# Patient Record
Sex: FEMALE | Race: WHITE
[De-identification: names, ages, dates, MRNs, and addresses within clinical notes are randomized per-mention and may not be internally consistent; named-entity substitution may affect disease eponyms.]

---

## 2018-09-13 ENCOUNTER — HOSPITAL ENCOUNTER (OUTPATIENT)
Age: 78
End: 2018-09-13
Payer: MEDICARE

## 2018-09-13 DIAGNOSIS — E03.9: ICD-10-CM

## 2018-09-13 DIAGNOSIS — C50.919: ICD-10-CM

## 2018-09-13 DIAGNOSIS — I10: ICD-10-CM

## 2018-09-13 DIAGNOSIS — E11.9: ICD-10-CM

## 2018-09-13 DIAGNOSIS — E78.00: Primary | ICD-10-CM

## 2018-09-13 LAB
ALBUMIN SERPL-MCNC: 4.2 G/DL (ref 3.5–5)
ALBUMIN/GLOB SERPL: 1.1 {RATIO} (ref 1–2.8)
ALP SERPL-CCNC: 170 U/L (ref 38–126)
ALT SERPL-CCNC: 31 IU/L (ref 9–52)
ATYPICAL LYMPHOCYTES PERCENT: 6 %
BUN SERPL-MCNC: 26 MG/DL (ref 7–17)
CALCIUM SERPL-MCNC: 10 MG/DL (ref 8.4–10.2)
CHLORIDE SERPL-SCNC: 106 MMOL/L (ref 98–107)
CHOLEST SERPL-MCNC: 159 MG/DL (ref 140–199)
CO2 SERPL-SCNC: 27 MMOL/L (ref 22–32)
EOSINOPHILS PERCENT MANUAL: 5 % (ref 2–4)
ESTIMATED GLOMERULAR FILT RATE: > 60 ML/MIN (ref 60–?)
GLOBULIN SER CALC-MCNC: 3.8 G/DL (ref 1.7–4.1)
GLUCOSE SERPL-MCNC: 124 MG/DL (ref 80–110)
HBA1C MFR BLD: 8.3 % (ref 4–6)
HDLC SERPL-MCNC: 31 MG/DL (ref 40–60)
HEMATOCRIT: 39 % (ref 36–46)
HEMOGLOBIN: 13.5 G/DL (ref 12–16)
HEMOLYSIS: < 15 (ref 0–50)
LYMPHOCYTES PERCENT MANUAL: 19 % (ref 25–45)
MCV RBC: 89.9 FL (ref 80–100)
MEAN CORPUSCULAR HEMOGLOBIN: 31.2 PG (ref 26–34)
MEAN CORPUSCULAR HGB CONC: 34.7 % (ref 30–36)
MONOCYTES PERCENT MANUAL: 6 % (ref 2–11)
NEUTROPHILS ABSOLUTE MANUAL: 4928 /UL (ref 3000–5900)
NEUTS SEG NFR BLD: 64 % (ref 38–70)
PLATELET COUNT: 207 X10^3/UL (ref 150–400)
POTASSIUM SERPL-SCNC: 5 MMOL/L (ref 3.4–5.1)
PROT SERPL-MCNC: 8 G/DL (ref 6.3–8.2)
SODIUM SERPL-SCNC: 145 MMOL/L (ref 137–145)
TOTAL CELLS COUNTED: 100
TRIGL SERPL-MCNC: 165 MG/DL (ref 35–150)
TSH SERPL DL<=0.05 MIU/L-ACNC: 6.43 UIU/ML (ref 0.47–4.68)

## 2018-09-13 PROCEDURE — 80061 LIPID PANEL: CPT

## 2018-09-13 PROCEDURE — 84443 ASSAY THYROID STIM HORMONE: CPT

## 2018-09-13 PROCEDURE — 36415 COLL VENOUS BLD VENIPUNCTURE: CPT

## 2018-09-13 PROCEDURE — 83036 HEMOGLOBIN GLYCOSYLATED A1C: CPT

## 2018-09-13 PROCEDURE — 80053 COMPREHEN METABOLIC PANEL: CPT

## 2018-09-13 PROCEDURE — 85025 COMPLETE CBC W/AUTO DIFF WBC: CPT

## 2018-09-17 ENCOUNTER — HOSPITAL ENCOUNTER (OUTPATIENT)
Age: 78
End: 2018-09-17
Payer: MEDICARE

## 2018-09-17 DIAGNOSIS — Z79.899: Primary | ICD-10-CM

## 2018-09-17 PROCEDURE — 77080 DXA BONE DENSITY AXIAL: CPT

## 2018-09-17 PROCEDURE — 93306 TTE W/DOPPLER COMPLETE: CPT

## 2018-09-17 NOTE — DI.ECHO.S_ITS
"                                    [facility]  
+---------+                        [facility]                        +---------+  
:         :                      [address]                     :         :  
:         :                      [address]                     :         :  
:         :                          [id number]                         :         :  
:         :                       Phone: [phone]                      :         :  
+---------+                        [phone]                        +---------+  
                             Echocardiogram Report  
+-----------------------------------------------------------------------------+  
:Name: EVANGELIST OSBORNE      Study Date: 2018          Height: 66 in  :  
:[facility] MRN #: [id number]                                    Weight: 212 lb :  
:Account #: [id number]         Gender: Female                  BSA: 2.1 m2    :  
:: 1940               Age: 78 yrs                     BP: 112/82 mmHg:  
:Reason For Study: BREAST CANCER                                              :  
:                              Performed By: Carolee Cagle                   :  
:Referring: SUMAYA LINDSEY                                                   :  
+-----------------------------------------------------------------------------+  
Interpretation Summary  
The ejection fraction is estimated to be 60-65%.  
There are no focal wall motion abnormalities.  
There is mild to moderate mitral regurgitation.  
Compared to the prior echo study, there has been no change in the severity of  
mitral regurgitation.  
There is mild aortic regurgitation.  
This is unchanged compared to the previous study.  
There is mild tricuspid regurgitation.  
Right ventricular systolic pressure is estimated to be 40 mmHg plus the  
clinically estimated CVP which cannot be estimated on this exam.  
Compared to the prior echo exam, there has been an increase in the severity of  
pulmonary hypertension.  
   
   
Procedure:   A two-dimensional transthoracic echocardiogram with color flow  
and Doppler was performed. The study quality was technically adequate.  
Comparison is made with the echocardiogram of 2017. The patient was in  
sinus bradycardia with heart rates between 48-57 bpm during the exam.  
Left Ventricle:   There is borderline concentric left ventricular hypertrophy.  
The left ventricle is normal in size. The ejection fraction is estimated to be  
60-65%. There are no focal wall motion abnormalities.  
Right Ventricle:   The right ventricle is normal in size and function.  
Atria:   Both atria are normal in size. There is no Doppler evidence for an  
interatrial shunt.  
Mitral Valve:   There is mild mitral annular calcification. There is a flat  
closure plane of the the mitral valve leaflets. There is mild to moderate  
mitral regurgitation. Compared to the prior echo study, there has been no  
change in the severity of mitral regurgitation.  
Aortic Valve:   The aortic valve is trileaflet. The aortic valve opens well.  
There is mild aortic regurgitation. This is unchanged compared to the previous  
study.  
Tricuspid Valve:   The tricuspid valve leaflets are thin and pliable. There is  
mild tricuspid regurgitation. Right ventricular systolic pressure is estimated  
to be 40 mmHg plus the clinically estimated CVP which cannot be estimated on  
this exam. Compared to the prior echo exam, there has been an increase in the  
severity of pulmonary hypertension.  
Pulmonic Valve:   The pulmonic valve is normal in structure and function.  
There is a trace or physiologic amount of pulmonic regurgitation.  
Great Vessels:   The aortic root is normal size. The ascending aorta is normal  
in size. The aortic arch is normal in size. The inferior vena cava was not  
visualized.  
Pericardium/ Pleura   There is no pericardial effusion.  
   
   
MMode/2D Measurements & Calculations  
LVIDd: 5.0 cm                
[id number]|2018 00:00:00|2018 11:29:00|.S_ITS|[facility]|Imaging|[id number]|"BILATERAL DIGITAL SCREENING MAMMOGRAM 3D/2D WITH CAD: 2018

## 2018-09-17 NOTE — DI.RAD.S_ITS
This blank DEXA report has been sent in error by the PACS system.  The correct and   
complete report will be forthcoming in 1-2 days.  Thank you for your patience and   
understanding.     
   
   
   
Dictated by: Mel Nicholas MD, PhD on 9/17/2018 at 11:36       
Approved by: Mel Nicholas MD, PhD on 9/17/2018 at 11:36

## 2018-09-26 ENCOUNTER — HOSPITAL ENCOUNTER
Age: 78
LOS: 1 days | End: 2018-09-27
Payer: MEDICARE

## 2018-09-26 DIAGNOSIS — Z85.819: ICD-10-CM

## 2018-09-26 DIAGNOSIS — C50.919: Primary | ICD-10-CM

## 2018-09-26 DIAGNOSIS — Z79.811: ICD-10-CM

## 2018-09-26 DIAGNOSIS — Z85.528: ICD-10-CM

## 2018-09-26 PROCEDURE — 99214 OFFICE O/P EST MOD 30 MIN: CPT

## 2018-09-26 NOTE — P.PNONC_ITS
PN -Subjective    
Interval history:     
Diagnosis:    
1.  Breast cancer, T3 N0 ER positive.  Previously treated with neoadjuvant   
Adriamycin and Cytoxan followed by docetaxel.  She had a bilateral mastectomy   
with pathologic complete response.  She also had postoperative chest wall   
radiation and has been on hormone therapy with anastrozole since 2003.    
2.  Renal cell carcinoma treated with nephrectomy in 2003.    
3. 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   
ago.    
    
Interval history:  The patient is a 78-year-old woman who returns today for   
follow-up.  She is feeling well and has no specific complaints.  She denies any   
new aches or pains.  She has not noted any adenopathy. No fevers chills or   
sweats.  No shortness of breath or cough.  No GI complaints.  She has not noted   
any changes on the chest wall or axilla.  She continues to take anastrozole.    
She has been on it for about 15 years now.  She is not having any hot flashes.    
She denies any musculoskeletal aches and pains.  She would like to continue   
indefinitely.    
    
Her past medical history is otherwise notable for diabetes hypertension   
hyperlipidemia and hypothyroidism.  She also has a history of primary   
sclerosing cholangitis.  She has had some depression of the last couple years   
following the death of her .    
    
Her medications include ursodiol, atenolol gabapentin anastrozole Lipitor   
insulin and levothyroxine    
    
    
    
Home Medications and Allergies    
 Home Medications    
    
    
    
 Medication  Instructions  Recorded  Confirmed  Type    
     
anastrozole 1 mg PO QDAY #0 06/15/17  History    
     
atorvastatin [Lipitor] 20 mg PO HS #0 06/15/17  History    
     
insulin aspart U-100 [Novolog #0 06/15/17  History    
    
Flexpen U-100 Insulin]        
     
insulin glargine [Lantus U-100 60 u SQ QDAY #0 06/15/17  History    
    
Insulin]        
     
levothyroxine [Levoxyl] 112 mcg PO #0 06/15/17  History    
    
    
    
 Allergies    
    
    
    
Allergy/AdvReac Type Severity Reaction Status Date / Time    
     
venom-honey bee Allergy Unknown  Unverified 04/11/18 13:10    
    
[BEE VENOM (HONEY BEE)]         
    
    
    
    
Exam    
    
- Constitutional    
positive no acute distress, positive obese    
    
- Routine HEENT Exam    
Head: Present: normocephalic, atraumatic    
Eye: Present: EOMI, PERRL.  Absent: conjunctival icterus, scleral injection    
ENT: Present: mucous membranes moist, oropharynx clear    
    
- Routine Neck Exam    
Present: supple.  Absent: lymphadenopathy, thyromegaly    
    
- Routine Chest/Breast/Axilla Exam    
Chest wall exam standard: Absent: tenderness, mass    
Breast: Present: right mastectomy, left mastectomy    
Axillae: Absent: lymphadenopathy    
    
- Routine Respiratory Exam    
Present: Clear to auscultation bilaterally.  Absent: rales, wheezes    
    
- Routine Cardiovascular Exam    
Present: RRR, S1, S2.  Absent: murmur    
    
- Routine Abdominal Exam    
Present: soft, normoactive bowel sounds.  Absent: tenderness, organomegaly, mass    
    
- Routine Extremities Exam    
Absent: cyanosis, clubbing, edema    
    
- Routine Back/Spine Exam    
Back/Spine: Absent: paraspinal tenderness, vertebral tenderness    
    
- Routine Skin Exam    
Present: intact.  Absent: petechiae, rash    
    
- Routine Neurological Exam    
Present: alert, oriented X3    
    
- Routine Psychiatric Exam    
Present: normal affect, normal thought process    
    
Results    
    
- Labs    
    
A CBC was within normal limits.  Metabolic panel showed a normal creatinine at   
0.9.  Alkaline phosphatase was 170 A AST was slightly elevated at 39.  TSH was   
slightly high at 6.4 in her hemoglobin A1c was 8.3.    
A DEXA scan showed normal bone density although decreased compared
[id number]|2018-09-26 13:31:47|2018-09-26 13:31:47|ONC.PN||||

## 2019-12-06 ENCOUNTER — HOSPITAL ENCOUNTER (EMERGENCY)
Age: 79
Discharge: HOME | End: 2019-12-06
Payer: MEDICARE

## 2019-12-06 VITALS
DIASTOLIC BLOOD PRESSURE: 75 MMHG | OXYGEN SATURATION: 97 % | TEMPERATURE: 97.7 F | SYSTOLIC BLOOD PRESSURE: 143 MMHG | RESPIRATION RATE: 18 BRPM | HEART RATE: 64 BPM

## 2019-12-06 VITALS
RESPIRATION RATE: 16 BRPM | DIASTOLIC BLOOD PRESSURE: 70 MMHG | SYSTOLIC BLOOD PRESSURE: 130 MMHG | HEART RATE: 60 BPM | OXYGEN SATURATION: 98 %

## 2019-12-06 VITALS — BODY MASS INDEX: 35.2 KG/M2

## 2019-12-06 DIAGNOSIS — S59.912A: ICD-10-CM
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
## 2019-12-06 DIAGNOSIS — S63.502A: Primary | ICD-10-CM

## 2019-12-06 DIAGNOSIS — S66.912A: ICD-10-CM

## 2019-12-06 PROCEDURE — 29260 STRAPPING OF ELBOW OR WRIST: CPT

## 2019-12-06 PROCEDURE — 99282 EMERGENCY DEPT VISIT SF MDM: CPT

## 2019-12-06 PROCEDURE — 73090 X-RAY EXAM OF FOREARM: CPT

## 2019-12-06 PROCEDURE — 73110 X-RAY EXAM OF WRIST: CPT

## 2019-12-06 PROCEDURE — 99283 EMERGENCY DEPT VISIT LOW MDM: CPT

## 2019-12-06 NOTE — DI.RAD.S_ITS
PROCEDURE:  XR FOREARM RT 2V  
   
INDICATIONS:  fall/proximal forearm pain  
   
TECHNIQUE:  2 views of the forearm were acquired.    
   
COMPARISON:  [facility], CR, XR WRIST LT MIN 3V, 12/06/2019, 7:55.  
   
FINDINGS:    
   
Bones:  No fractures or dislocations.  No suspicious bony lesions.  Degenerative   
osteoarthritis at the radiocarpal joint is moderate to moderately severe but no trauma is   
found  
   
Soft tissues:  No suspicious soft tissue calcifications or masses.    
   
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   
but no trauma found.  
   
   
   
   
   
Dictated by: Aravind Vasquez M.D. on 12/06/2019 at 8:20       
Approved by: Aravind Vasquez M.D. on 12/06/2019 at 8:21

## 2019-12-06 NOTE — ED_ITS
"HPI - Extremity Injury (Upper)    
General    
Chief Complaint: Extremity Injury, Upper    
Stated Complaint: fell last night,hurt left wrist    
Time Seen by Provider: 12/06/19 07:42    
Source: patient    
Mode of arrival: Ambulatory    
Limitations: no limitations    
History of Present Illness    
HPI narrative: Patient comes emergency department complaining of left wrist and   
forearm pain after tripping and falling and landing on her outstretched arm on   
the pavement yesterday evening.  Patient states that she sustained bruises to   
her right hip and knee, but otherwise, was not injured.  She has been able to   
ambulate on the right lower extremity without difficulty.  Patient denies prior   
history of injury to the left wrist.  She states it hurts to move, but should   
come in right away because she was hoping it is just bruised.  She denies neck   
pain.  She states that when she fell, her head hit the ground, mainly making   
contact with her glasses.  She states that she has not had any pain and did not   
hit her head very hard.  She has not noticed any bruising or swelling.  no   
visual changes or neck pain.  patient denies rib pain.  No back pain.  no   
abdominal pain.  No other complaints at this time.    
Related Data    
                                Home Medications    
    
    
    
 Medication  Instructions  Recorded  Confirmed    
     
anastrozole 1 mg PO QDAY #0 06/15/17 09/26/18    
     
atorvastatin [Lipitor] 20 mg PO HS #0 06/15/17 09/26/18    
     
insulin aspart U-100 [Novolog #0 06/15/17     
    
Flexpen U-100 Insulin]       
     
insulin glargine [Lantus U-100 60 u SQ QDAY #0 06/15/17     
    
Insulin]       
     
levothyroxine [Levoxyl] 112 mcg PO DAILY #0 06/15/17 09/26/18    
    
    
    
                                    Allergies    
    
    
    
Allergy/AdvReac Type Severity Reaction Status Date / Time    
     
venom-honey bee Allergy Unknown  Verified 09/26/18 13:51    
    
[BEE VENOM (HONEY BEE)]         
    
    
    
    
Review of Systems    
Constitutional    
Constitutional: Denies chills, Denies fatigue, Denies fever(s), Denies frequent   
falls, Denies lethargy and Denies weakness    
Eyes    
Eyes: Denies change in vision, Denies eye discharge, Denies irritation and Eddi  
es loss of vision    
ENT    
Ears, Nose, Mouth, and Throat: Denies change in voice, Denies dizziness, Denies   
neck pain, Denies sore throat and Denies throat swelling    
Cardiovascular    
Cardiovascular: Denies chest pain, Denies irregular heart rhythm, Denies   
lightheadedness, Denies palpitations, Denies dyspnea, Denies dyspnea on exertion  
and Denies orthopnea    
Respiratory    
Respiratory: Denies cough, Denies dyspnea, Denies dyspnea on exertion and Denies  
wheezing    
Gastrointestinal    
Gastrointestinal: Denies abdominal pain, Denies change in bowel habits, Denies   
diarrhea, Denies nausea and Denies vomiting    
Genitourinary    
Genitourinary: Denies hematuria, Denies flank pain, Denies urinary incontinence   
and Denies urinary urgency    
Musculoskeletal    
Musculoskeletal: Denies back pain, Denies muscle weakness, Denies neck pain,   
Denies numbness and Denies tingling    
Comments: Wrist pain    
Integumentary/Breasts    
Skin/Breast: Denies pruritus, Denies erythema, Denies rash and Denies wounds    
Neurologic    
Neurologic: Denies behavioral changes, Denies confusion, Denies dizziness, D  
enies frequent falls, Denies loss of vision, Denies numbness, Denies tingling   
and Denies weakness    
Psychiatric    
Psychiatric: Denies anxiety, Denies behavioral changes, Denies confusion, Denies  
depression, Denies homicidal ideation and Denies suicidal ideation    
Endocrine    
Endocrine: Denies fatigue, Denies flushing and Denies palpitations    
Hematologic/Lymphatic    
Hematologic/Lymphatic: Denies easy bruising    
Allergic/Immunologic    
Allergic/Immunologic
[id number]|2019-12-06 07:31:00|2019-12-06 08:23:00|DI.RAD.S_ITS|[facility]|Imaging|[id number]|"PROCEDURE:  XR WRIST LT MIN 3V

## 2020-05-28 ENCOUNTER — HOSPITAL ENCOUNTER (OUTPATIENT)
Age: 80
End: 2020-05-28
Payer: MEDICARE

## 2020-05-28 DIAGNOSIS — E11.9: Primary | ICD-10-CM

## 2020-05-28 LAB — HBA1C MFR BLD: 7.4 % (ref 4–6)

## 2020-05-28 PROCEDURE — 36415 COLL VENOUS BLD VENIPUNCTURE: CPT

## 2020-05-28 PROCEDURE — 83036 HEMOGLOBIN GLYCOSYLATED A1C: CPT

## 2021-01-27 ENCOUNTER — HOSPITAL ENCOUNTER (OUTPATIENT)
Age: 81
End: 2021-01-27
Payer: MEDICARE

## 2021-01-27 DIAGNOSIS — Z23: Primary | ICD-10-CM

## 2021-01-27 PROCEDURE — 0011A: CPT

## 2021-01-27 PROCEDURE — 91301: CPT

## 2021-02-24 ENCOUNTER — HOSPITAL ENCOUNTER (OUTPATIENT)
Age: 81
End: 2021-02-24
Payer: MEDICARE

## 2021-02-24 DIAGNOSIS — Z23: Primary | ICD-10-CM

## 2021-02-24 PROCEDURE — 0012A: CPT

## 2021-02-24 PROCEDURE — 91301: CPT

## 2021-11-18 ENCOUNTER — HOSPITAL ENCOUNTER (OUTPATIENT)
Age: 81
End: 2021-11-18
Payer: MEDICARE

## 2021-11-18 DIAGNOSIS — Z20.822: Primary | ICD-10-CM

## 2021-11-18 PROCEDURE — 87635 SARS-COV-2 COVID-19 AMP PRB: CPT

## 2022-05-04 ENCOUNTER — HOSPITAL ENCOUNTER (OUTPATIENT)
Age: 82
End: 2022-05-04
Payer: MEDICARE

## 2022-05-04 DIAGNOSIS — E03.9: Primary | ICD-10-CM

## 2022-05-04 DIAGNOSIS — I10: ICD-10-CM

## 2022-05-04 DIAGNOSIS — E11.42: ICD-10-CM

## 2022-05-04 DIAGNOSIS — E78.2: ICD-10-CM

## 2022-05-04 LAB
ALBUMIN SERPL-MCNC: 4.3 G/DL (ref 3.5–5)
ALBUMIN/GLOB SERPL: 1.1 {RATIO} (ref 1–2.8)
ALP SERPL-CCNC: 175 U/L (ref 38–126)
ALT SERPL-CCNC: 25 IU/L (ref ?–35)
BUN SERPL-MCNC: 32 MG/DL (ref 7–17)
CALCIUM SERPL-MCNC: 8.9 MG/DL (ref 8.4–10.2)
CHLORIDE SERPL-SCNC: 103 MMOL/L (ref 98–107)
CHOLEST SERPL-MCNC: 175 MG/DL (ref 140–199)
CO2 SERPL-SCNC: 27 MMOL/L (ref 22–32)
ESTIMATED GLOMERULAR FILT RATE: 37 ML/MIN (ref 60–?)
GLOBULIN SER CALC-MCNC: 3.9 G/DL (ref 1.7–4.1)
GLUCOSE SERPL-MCNC: 224 MG/DL (ref 80–110)
HBA1C MFR BLD: 10.4 % (ref 4–6)
HDLC SERPL-MCNC: 31 MG/DL (ref 40–60)
HEMATOCRIT: 38.2 % (ref 36–46)
HEMOGLOBIN: 12.8 G/DL (ref 12–16)
HEMOLYSIS: < 15 (ref 0–50)
MCV RBC: 90.9 FL (ref 80–100)
MEAN CORPUSCULAR HEMOGLOBIN: 30.5 PG (ref 26–34)
MEAN CORPUSCULAR HGB CONC: 33.6 % (ref 30–36)
PLATELET COUNT: 190 X10^3/UL (ref 150–400)
POTASSIUM SERPL-SCNC: 4.5 MMOL/L (ref 3.4–5.1)
PROT SERPL-MCNC: 8.2 G/DL (ref 6.3–8.2)
SODIUM SERPL-SCNC: 138 MMOL/L (ref 137–145)
T4 FREE SERPL-MCNC: 1.27 NG/DL (ref 0.78–2.19)
TRIGL SERPL-MCNC: 239 MG/DL (ref 35–150)
TSH W/ REFLEX TO FT4: 9.29 UIU/ML (ref 0.47–4.68)

## 2022-05-04 PROCEDURE — 80053 COMPREHEN METABOLIC PANEL: CPT

## 2022-05-04 PROCEDURE — 83036 HEMOGLOBIN GLYCOSYLATED A1C: CPT

## 2022-05-04 PROCEDURE — 80061 LIPID PANEL: CPT

## 2022-05-04 PROCEDURE — 84443 ASSAY THYROID STIM HORMONE: CPT

## 2022-05-04 PROCEDURE — 36415 COLL VENOUS BLD VENIPUNCTURE: CPT

## 2022-05-04 PROCEDURE — 84439 ASSAY OF FREE THYROXINE: CPT

## 2022-05-04 PROCEDURE — 85027 COMPLETE CBC AUTOMATED: CPT

## 2022-05-05 ENCOUNTER — HOSPITAL ENCOUNTER (OUTPATIENT)
Age: 82
End: 2022-05-05
Payer: MEDICARE

## 2022-05-05 DIAGNOSIS — E03.9: Primary | ICD-10-CM

## 2022-05-05 DIAGNOSIS — E78.2: ICD-10-CM

## 2022-05-05 DIAGNOSIS — E11.42: ICD-10-CM

## 2022-05-05 DIAGNOSIS — I10: ICD-10-CM

## 2022-05-05 LAB — MICROALBUMI CREATININ RATIO UR: 50.5 UG/MG CR (ref ?–30)

## 2022-05-05 PROCEDURE — 82570 ASSAY OF URINE CREATININE: CPT

## 2022-05-05 PROCEDURE — 82043 UR ALBUMIN QUANTITATIVE: CPT

## 2022-11-22 ENCOUNTER — HOSPITAL ENCOUNTER (OUTPATIENT)
Age: 82
End: 2022-11-22
Payer: MEDICARE

## 2022-11-22 DIAGNOSIS — E11.9: Primary | ICD-10-CM

## 2022-11-22 DIAGNOSIS — E11.42: Primary | ICD-10-CM

## 2022-11-22 DIAGNOSIS — Z79.84: ICD-10-CM

## 2022-11-22 DIAGNOSIS — Z71.3: ICD-10-CM

## 2022-11-22 LAB — HBA1C MFR BLD: 9 % (ref 4–6)

## 2022-11-22 PROCEDURE — 97802 MEDICAL NUTRITION INDIV IN: CPT

## 2022-11-22 PROCEDURE — 36415 COLL VENOUS BLD VENIPUNCTURE: CPT

## 2022-11-22 PROCEDURE — 83036 HEMOGLOBIN GLYCOSYLATED A1C: CPT

## 2023-02-28 ENCOUNTER — HOSPITAL ENCOUNTER (OUTPATIENT)
Age: 83
End: 2023-02-28
Payer: MEDICARE

## 2023-02-28 DIAGNOSIS — E03.9: Primary | ICD-10-CM

## 2023-02-28 DIAGNOSIS — I10: ICD-10-CM

## 2023-02-28 DIAGNOSIS — E11.42: ICD-10-CM

## 2023-02-28 DIAGNOSIS — E78.2: ICD-10-CM

## 2023-02-28 DIAGNOSIS — E55.9: ICD-10-CM

## 2023-02-28 DIAGNOSIS — N18.32: ICD-10-CM

## 2023-02-28 LAB
25(OH)D3+25(OH)D2 SERPL-MCNC: < 12.8 NG/ML (ref 30–100)
ALBUMIN SERPL-MCNC: 3.7 G/DL (ref 3.5–5)
ALBUMIN/GLOB SERPL: 1 {RATIO} (ref 1–2.8)
ALP SERPL-CCNC: 176 U/L (ref 38–126)
ALT SERPL-CCNC: 16 IU/L (ref ?–35)
BUN SERPL-MCNC: 30 MG/DL (ref 7–17)
CALCIUM SERPL-MCNC: 9.1 MG/DL (ref 8.4–10.2)
CHLORIDE SERPL-SCNC: 100 MMOL/L (ref 98–107)
CHOLEST SERPL-MCNC: 165 MG/DL (ref 140–199)
CO2 SERPL-SCNC: 27 MMOL/L (ref 22–32)
ESTIMATED GLOMERULAR FILT RATE: 44 ML/MIN (ref 60–?)
GLOBULIN SER CALC-MCNC: 3.6 G/DL (ref 1.7–4.1)
GLUCOSE SERPL-MCNC: 172 MG/DL (ref 80–110)
HBA1C MFR BLD: 8 % (ref 4–6)
HDLC SERPL-MCNC: 37 MG/DL (ref 40–60)
HEMATOCRIT: 37 % (ref 36–46)
HEMOGLOBIN: 12.2 G/DL (ref 12–16)
HEMOLYSIS: < 15 (ref 0–50)
MCV RBC: 90.6 FL (ref 80–100)
MEAN CORPUSCULAR HEMOGLOBIN: 29.9 PG (ref 26–34)
MEAN CORPUSCULAR HGB CONC: 33 % (ref 30–36)
PLATELET COUNT: 194 X10^3/UL (ref 150–400)
POTASSIUM SERPL-SCNC: 4.8 MMOL/L (ref 3.4–5.1)
PROT SERPL-MCNC: 7.3 G/DL (ref 6.3–8.2)
SODIUM SERPL-SCNC: 137 MMOL/L (ref 137–145)
T4 FREE SERPL-MCNC: 0.88 NG/DL (ref 0.78–2.19)
TRIGL SERPL-MCNC: 331 MG/DL (ref 35–150)
TSH W/ REFLEX TO FT4: 8.1 UIU/ML (ref 0.47–4.68)

## 2023-02-28 PROCEDURE — 82306 VITAMIN D 25 HYDROXY: CPT

## 2023-02-28 PROCEDURE — 83036 HEMOGLOBIN GLYCOSYLATED A1C: CPT

## 2023-02-28 PROCEDURE — 84443 ASSAY THYROID STIM HORMONE: CPT

## 2023-02-28 PROCEDURE — 80061 LIPID PANEL: CPT

## 2023-02-28 PROCEDURE — 84439 ASSAY OF FREE THYROXINE: CPT

## 2023-02-28 PROCEDURE — 83970 ASSAY OF PARATHORMONE: CPT

## 2023-02-28 PROCEDURE — 85027 COMPLETE CBC AUTOMATED: CPT

## 2023-02-28 PROCEDURE — 82310 ASSAY OF CALCIUM: CPT

## 2023-02-28 PROCEDURE — 80053 COMPREHEN METABOLIC PANEL: CPT

## 2023-02-28 PROCEDURE — 36415 COLL VENOUS BLD VENIPUNCTURE: CPT

## 2023-03-02 LAB
CALCIUM: 9.4 MG/DL (ref 8.7–10.3)
PARATHYROID HORMONE, INTACT: 72 PG/ML (ref 15–65)

## 2023-07-21 ENCOUNTER — HOSPITAL ENCOUNTER (OUTPATIENT)
Age: 83
End: 2023-07-21
Payer: MEDICARE

## 2023-07-21 DIAGNOSIS — E03.9: ICD-10-CM

## 2023-07-21 DIAGNOSIS — E55.9: ICD-10-CM

## 2023-07-21 DIAGNOSIS — I10: Primary | ICD-10-CM

## 2023-07-21 LAB
25(OH)D3+25(OH)D2 SERPL-MCNC: 27.3 NG/ML (ref 30–100)
BUN SERPL-MCNC: 24 MG/DL (ref 7–17)
CALCIUM SERPL-MCNC: 9.6 MG/DL (ref 8.4–10.2)
CHLORIDE SERPL-SCNC: 102 MMOL/L (ref 98–107)
CO2 SERPL-SCNC: 30 MMOL/L (ref 22–32)
ESTIMATED GLOMERULAR FILT RATE: 42 ML/MIN (ref 60–?)
GLUCOSE SERPL-MCNC: 119 MG/DL (ref 80–110)
HEMOLYSIS: < 15 (ref 0–50)
POTASSIUM SERPL-SCNC: 4.7 MMOL/L (ref 3.4–5.1)
SODIUM SERPL-SCNC: 139 MMOL/L (ref 137–145)
T4 FREE SERPL-MCNC: 0.96 NG/DL (ref 0.78–2.19)
TSH W/ REFLEX TO FT4: 7.14 UIU/ML (ref 0.47–4.68)

## 2023-07-21 PROCEDURE — 84439 ASSAY OF FREE THYROXINE: CPT

## 2023-07-21 PROCEDURE — 84443 ASSAY THYROID STIM HORMONE: CPT

## 2023-07-21 PROCEDURE — 36415 COLL VENOUS BLD VENIPUNCTURE: CPT

## 2023-07-21 PROCEDURE — 80048 BASIC METABOLIC PNL TOTAL CA: CPT

## 2023-07-21 PROCEDURE — 82306 VITAMIN D 25 HYDROXY: CPT

## 2024-11-19 ENCOUNTER — HOSPITAL ENCOUNTER (OUTPATIENT)
Dept: HOSPITAL 73 - LAB | Age: 84
End: 2024-11-19
Payer: MEDICARE

## 2024-11-19 DIAGNOSIS — I12.9: ICD-10-CM

## 2024-11-19 DIAGNOSIS — E03.9: ICD-10-CM

## 2024-11-19 DIAGNOSIS — E11.42: Primary | ICD-10-CM

## 2024-11-19 DIAGNOSIS — N18.32: ICD-10-CM

## 2024-11-19 DIAGNOSIS — E78.2: ICD-10-CM

## 2024-11-19 LAB
BUN SERPL-MCNC: 31 MG/DL (ref 7–17)
CALCIUM SERPL-MCNC: 9.6 MG/DL (ref 8.4–10.2)
CHLORIDE SERPL-SCNC: 105 MMOL/L (ref 98–107)
CHOLEST SERPL-MCNC: 188 MG/DL (ref 140–199)
CO2 SERPL-SCNC: 29 MMOL/L (ref 22–32)
ESTIMATED GLOMERULAR FILT RATE: 39 ML/MIN (ref 60–?)
GLUCOSE SERPL-MCNC: 95 MG/DL (ref 80–110)
HBA1C MFR BLD: 8.5 % (ref 4–6)
HDLC SERPL-MCNC: 39 MG/DL (ref 40–60)
HEMATOCRIT: 41.1 % (ref 36–46)
HEMOGLOBIN: 13.4 G/DL (ref 12–16)
HEMOLYSIS: < 15 (ref 0–50)
MCV RBC: 91.7 FL (ref 80–100)
MEAN CORPUSCULAR HEMOGLOBIN: 29.9 PG (ref 26–34)
MEAN CORPUSCULAR HGB CONC: 32.7 % (ref 30–36)
PLATELET COUNT: 216 X10^3/UL (ref 150–400)
POTASSIUM SERPL-SCNC: 4.5 MMOL/L (ref 3.4–5.1)
SODIUM SERPL-SCNC: 140 MMOL/L (ref 137–145)
T4 FREE SERPL-MCNC: 0.76 NG/DL (ref 0.78–2.19)
TRIGL SERPL-MCNC: 193 MG/DL (ref 35–150)
TSH W/ REFLEX TO FT4: 5.66 UIU/ML (ref 0.47–4.68)

## 2024-11-19 PROCEDURE — 80061 LIPID PANEL: CPT

## 2024-11-19 PROCEDURE — 84439 ASSAY OF FREE THYROXINE: CPT

## 2024-11-19 PROCEDURE — 84450 TRANSFERASE (AST) (SGOT): CPT

## 2024-11-19 PROCEDURE — 84443 ASSAY THYROID STIM HORMONE: CPT

## 2024-11-19 PROCEDURE — 85027 COMPLETE CBC AUTOMATED: CPT

## 2024-11-19 PROCEDURE — 83036 HEMOGLOBIN GLYCOSYLATED A1C: CPT

## 2024-11-19 PROCEDURE — 36415 COLL VENOUS BLD VENIPUNCTURE: CPT

## 2024-11-19 PROCEDURE — 80048 BASIC METABOLIC PNL TOTAL CA: CPT

## 2025-04-21 ENCOUNTER — HOSPITAL ENCOUNTER (EMERGENCY)
Age: 85
Discharge: HOME | End: 2025-04-21
Payer: MEDICARE

## 2025-04-21 VITALS
SYSTOLIC BLOOD PRESSURE: 168 MMHG | DIASTOLIC BLOOD PRESSURE: 69 MMHG | OXYGEN SATURATION: 98 % | HEART RATE: 74 BPM | RESPIRATION RATE: 19 BRPM

## 2025-04-21 VITALS
RESPIRATION RATE: 18 BRPM | HEART RATE: 99 BPM | OXYGEN SATURATION: 95 % | DIASTOLIC BLOOD PRESSURE: 89 MMHG | SYSTOLIC BLOOD PRESSURE: 165 MMHG | TEMPERATURE: 97.9 F

## 2025-04-21 VITALS — BODY MASS INDEX: 36 KG/M2

## 2025-04-21 DIAGNOSIS — N18.9: ICD-10-CM

## 2025-04-21 DIAGNOSIS — I12.9: Primary | ICD-10-CM

## 2025-04-21 DIAGNOSIS — I45.10: ICD-10-CM

## 2025-04-21 LAB
ADD MANUAL DIFF / SLIDE REVIEW: NO
ALBUMIN SERPL-MCNC: 4.2 G/DL (ref 3.5–5)
ALP SERPL-CCNC: 136 U/L (ref 38–126)
ALT SERPL-CCNC: 22 IU/L (ref ?–35)
BUN SERPL-MCNC: 31 MG/DL (ref 7–17)
CALCIUM SERPL-MCNC: 9.6 MG/DL (ref 8.4–10.2)
CHLORIDE SERPL-SCNC: 103 MMOL/L (ref 98–107)
CK SERPL-CCNC: 56 U/L (ref 30–135)
CO2 SERPL-SCNC: 22 MMOL/L (ref 22–32)
ESTIMATED GLOMERULAR FILT RATE: 42 ML/MIN (ref 60–?)
GLOBULIN SER CALC-MCNC: 4.3 G/DL (ref 1.7–4.1)
GLUCOSE SERPL-MCNC: 217 MG/DL (ref 80–110)
HEMATOCRIT: 41.1 % (ref 36–46)
HEMOGLOBIN: 13.6 G/DL (ref 12–16)
HEMOLYSIS: 87 (ref 0–50)
LIPASE SERPL-CCNC: 90 U/L (ref 23–300)
LYMPHOCYTES # SPEC AUTO: 1400 /UL (ref 1100–4500)
MAGNESIUM SERPL-MCNC: 1.8 MG/DL (ref 1.6–2.3)
MCH RBC QN AUTO: 29.3 PG (ref 26–34)
MCV RBC: 88.4 FL (ref 80–100)
MEAN CORPUSCULAR HGB CONC: 33.1 % (ref 30–36)
NT-PROBNP SERPL-MCNC: 644 PG/ML (ref ?–450)
PLATELET COUNT: 188 X10^3/UL (ref 150–400)
POTASSIUM SERPL-SCNC: 4.5 MMOL/L (ref 3.4–5.1)
PROT SERPL-MCNC: 8.5 G/DL (ref 6.3–8.2)
PROTHROMBIN TIME: 11.2 SECONDS (ref 9.4–12.5)
PTT PARTIAL THROMBOPLASTIN TIM: 38 SECONDS (ref 25.1–36.5)
SODIUM SERPL-SCNC: 137 MMOL/L (ref 137–145)
TROPONIN I SERPL-MCNC: 0.01 NG/ML (ref 0.01–0.03)

## 2025-04-21 PROCEDURE — 83880 ASSAY OF NATRIURETIC PEPTIDE: CPT

## 2025-04-21 PROCEDURE — 85025 COMPLETE CBC W/AUTO DIFF WBC: CPT

## 2025-04-21 PROCEDURE — 99284 EMERGENCY DEPT VISIT MOD MDM: CPT

## 2025-04-21 PROCEDURE — 80053 COMPREHEN METABOLIC PANEL: CPT

## 2025-04-21 PROCEDURE — 82550 ASSAY OF CK (CPK): CPT

## 2025-04-21 PROCEDURE — 83690 ASSAY OF LIPASE: CPT

## 2025-04-21 PROCEDURE — 71045 X-RAY EXAM CHEST 1 VIEW: CPT

## 2025-04-21 PROCEDURE — 84484 ASSAY OF TROPONIN QUANT: CPT

## 2025-04-21 PROCEDURE — 85610 PROTHROMBIN TIME: CPT

## 2025-04-21 PROCEDURE — 83735 ASSAY OF MAGNESIUM: CPT

## 2025-04-21 PROCEDURE — 99283 EMERGENCY DEPT VISIT LOW MDM: CPT

## 2025-04-21 PROCEDURE — 85730 THROMBOPLASTIN TIME PARTIAL: CPT

## 2025-04-21 PROCEDURE — 93005 ELECTROCARDIOGRAM TRACING: CPT
